# Patient Record
Sex: FEMALE
[De-identification: names, ages, dates, MRNs, and addresses within clinical notes are randomized per-mention and may not be internally consistent; named-entity substitution may affect disease eponyms.]

---

## 2018-11-02 ENCOUNTER — HOSPITAL ENCOUNTER (OUTPATIENT)
Dept: HOSPITAL 31 - C.SDS | Age: 33
Discharge: HOME | End: 2018-11-02
Payer: COMMERCIAL

## 2018-11-02 VITALS — TEMPERATURE: 98.6 F | RESPIRATION RATE: 18 BRPM

## 2018-11-02 VITALS — HEART RATE: 78 BPM | SYSTOLIC BLOOD PRESSURE: 142 MMHG | DIASTOLIC BLOOD PRESSURE: 74 MMHG

## 2018-11-02 VITALS — OXYGEN SATURATION: 100 %

## 2018-11-02 DIAGNOSIS — K80.10: Primary | ICD-10-CM

## 2018-11-02 PROCEDURE — 47562 LAPAROSCOPIC CHOLECYSTECTOMY: CPT

## 2018-11-02 PROCEDURE — 88304 TISSUE EXAM BY PATHOLOGIST: CPT

## 2018-11-12 NOTE — OP
PROCEDURE DATE:  11/02/2018



PREOPERATIVE DIAGNOSES:  Cholecystitis, gallstones.



POSTOPERATIVE DIAGNOSIS:  Cholecystitis.



OPERATION PERFORMED:  Robotic cholecystectomy.



SURGEON:  Richard Kahn MD



ASSISTANT:  Dr. Godwin



DESCRIPTION OF PROCEDURE:  In the operating room, the patient was

identified by name, number, procedure, laterality by damian.  In the

operating room, the patient was identified by both.  The abdomen was

entered through a Visiport placed in the umbilicus.  Gentle to that, right

angle was aligned to the gallbladder.  Two trocars were placed followed by

an assistant port in the right upper quadrant.  This having been done, it

was docked to the robot.  Having done that, the dissection easily began. 

One arm was used to pull the gallbladder superiorly, and the dissection was

done bimanually, pulling up on the infundibulum to expose the peritoneum

over the cystic duct and artery, which were both readily identified.  The

cystic duct was circumscribed as was the artery.  Visualization of the

liver plate was identified and after achieving the view safety, the cystic

duct was doubly clipped as was the artery.  Multiple small areas on the

liver were cauterized as was the bed.  Gallbladder was taken off the liver

bed using the hook.  The gallbladder was placed in the bag and removed

through the accessory port.  The abdomen was irrigated and dried. 

Hemostasis was excellent.  There was no bile.  Looking around, there was

nothing untoward.  The operation proceeded.  The 8-mm port when I closed

with the midline, which was dilated to remove the gallbladder, was closed

under direct vision with 0-Vicryl on a  needle.  TAP block was done on

either side using total of 50 mL of dilute 0.25% Marcaine.  Incisions were

closed with subcutaneous Vicryl followed by subcuticular PDS.  The patient

was taken to the recovery room in good condition.  Sponge and needle counts

were correct.





__________________________________________

Richard Kahn MD





DD:  11/12/2018 12:51:22

DT:  11/12/2018 14:05:45

Job # [id number]